# Patient Record
Sex: MALE | Race: WHITE | NOT HISPANIC OR LATINO | URBAN - METROPOLITAN AREA
[De-identification: names, ages, dates, MRNs, and addresses within clinical notes are randomized per-mention and may not be internally consistent; named-entity substitution may affect disease eponyms.]

---

## 2018-09-27 ENCOUNTER — OFFICE (AMBULATORY)
Dept: URBAN - METROPOLITAN AREA CLINIC 64 | Facility: CLINIC | Age: 28
End: 2018-09-27

## 2018-09-27 VITALS
HEIGHT: 69 IN | SYSTOLIC BLOOD PRESSURE: 128 MMHG | DIASTOLIC BLOOD PRESSURE: 74 MMHG | WEIGHT: 258 LBS | HEART RATE: 97 BPM

## 2018-09-27 DIAGNOSIS — R11.2 NAUSEA WITH VOMITING, UNSPECIFIED: ICD-10-CM

## 2018-09-27 DIAGNOSIS — K21.9 GASTRO-ESOPHAGEAL REFLUX DISEASE WITHOUT ESOPHAGITIS: ICD-10-CM

## 2018-09-27 DIAGNOSIS — R10.33 PERIUMBILICAL PAIN: ICD-10-CM

## 2018-09-27 PROCEDURE — 99203 OFFICE O/P NEW LOW 30 MIN: CPT | Performed by: NURSE PRACTITIONER

## 2018-09-27 RX ORDER — OMEPRAZOLE 40 MG/1
40 CAPSULE, DELAYED RELEASE ORAL
Qty: 90 | Refills: 3 | Status: COMPLETED
Start: 2018-09-27 | End: 2018-12-18

## 2018-12-18 ENCOUNTER — OFFICE (AMBULATORY)
Dept: URBAN - METROPOLITAN AREA CLINIC 64 | Facility: CLINIC | Age: 28
End: 2018-12-18

## 2018-12-18 VITALS
SYSTOLIC BLOOD PRESSURE: 125 MMHG | DIASTOLIC BLOOD PRESSURE: 83 MMHG | HEIGHT: 69 IN | HEART RATE: 109 BPM | WEIGHT: 255 LBS

## 2018-12-18 DIAGNOSIS — K64.8 OTHER HEMORRHOIDS: ICD-10-CM

## 2018-12-18 DIAGNOSIS — K60.2 ANAL FISSURE, UNSPECIFIED: ICD-10-CM

## 2018-12-18 PROCEDURE — 99214 OFFICE O/P EST MOD 30 MIN: CPT | Performed by: INTERNAL MEDICINE

## 2019-04-08 ENCOUNTER — OFFICE (AMBULATORY)
Dept: URBAN - METROPOLITAN AREA CLINIC 64 | Facility: CLINIC | Age: 29
End: 2019-04-08

## 2019-04-08 VITALS
SYSTOLIC BLOOD PRESSURE: 140 MMHG | HEIGHT: 69 IN | DIASTOLIC BLOOD PRESSURE: 85 MMHG | WEIGHT: 260 LBS | HEART RATE: 93 BPM

## 2019-04-08 DIAGNOSIS — K60.2 ANAL FISSURE, UNSPECIFIED: ICD-10-CM

## 2019-04-08 DIAGNOSIS — K64.8 OTHER HEMORRHOIDS: ICD-10-CM

## 2019-04-08 PROCEDURE — 99214 OFFICE O/P EST MOD 30 MIN: CPT | Performed by: INTERNAL MEDICINE

## 2019-04-09 ENCOUNTER — ON CAMPUS - OUTPATIENT (AMBULATORY)
Dept: URBAN - METROPOLITAN AREA HOSPITAL 2 | Facility: HOSPITAL | Age: 29
End: 2019-04-09

## 2019-04-09 ENCOUNTER — OFFICE (AMBULATORY)
Dept: URBAN - METROPOLITAN AREA PATHOLOGY 4 | Facility: PATHOLOGY | Age: 29
End: 2019-04-09
Payer: COMMERCIAL

## 2019-04-09 VITALS
RESPIRATION RATE: 16 BRPM | HEART RATE: 102 BPM | SYSTOLIC BLOOD PRESSURE: 130 MMHG | SYSTOLIC BLOOD PRESSURE: 154 MMHG | RESPIRATION RATE: 20 BRPM | HEART RATE: 93 BPM | SYSTOLIC BLOOD PRESSURE: 134 MMHG | SYSTOLIC BLOOD PRESSURE: 168 MMHG | SYSTOLIC BLOOD PRESSURE: 127 MMHG | DIASTOLIC BLOOD PRESSURE: 76 MMHG | DIASTOLIC BLOOD PRESSURE: 84 MMHG | HEART RATE: 80 BPM | DIASTOLIC BLOOD PRESSURE: 82 MMHG | DIASTOLIC BLOOD PRESSURE: 71 MMHG | DIASTOLIC BLOOD PRESSURE: 86 MMHG | OXYGEN SATURATION: 98 % | HEART RATE: 89 BPM | HEART RATE: 95 BPM | SYSTOLIC BLOOD PRESSURE: 118 MMHG | OXYGEN SATURATION: 99 % | OXYGEN SATURATION: 100 % | SYSTOLIC BLOOD PRESSURE: 123 MMHG | HEART RATE: 98 BPM | HEIGHT: 69 IN | TEMPERATURE: 97.3 F | DIASTOLIC BLOOD PRESSURE: 80 MMHG | OXYGEN SATURATION: 94 % | DIASTOLIC BLOOD PRESSURE: 67 MMHG | RESPIRATION RATE: 18 BRPM | WEIGHT: 251 LBS | HEART RATE: 99 BPM

## 2019-04-09 DIAGNOSIS — K29.50 UNSPECIFIED CHRONIC GASTRITIS WITHOUT BLEEDING: ICD-10-CM

## 2019-04-09 DIAGNOSIS — K52.9 NONINFECTIVE GASTROENTERITIS AND COLITIS, UNSPECIFIED: ICD-10-CM

## 2019-04-09 DIAGNOSIS — R19.5 OTHER FECAL ABNORMALITIES: ICD-10-CM

## 2019-04-09 DIAGNOSIS — K60.2 ANAL FISSURE, UNSPECIFIED: ICD-10-CM

## 2019-04-09 DIAGNOSIS — K64.8 OTHER HEMORRHOIDS: ICD-10-CM

## 2019-04-09 PROBLEM — K60.0 ACUTE ANAL FISSURE: Status: ACTIVE | Noted: 2019-04-09

## 2019-04-09 LAB
GI HISTOLOGY: A. SELECT: (no result)
GI HISTOLOGY: PDF REPORT: (no result)

## 2019-04-09 PROCEDURE — 88305 TISSUE EXAM BY PATHOLOGIST: CPT | Performed by: INTERNAL MEDICINE

## 2019-04-09 PROCEDURE — 45331 SIGMOIDOSCOPY AND BIOPSY: CPT | Performed by: INTERNAL MEDICINE

## 2019-05-06 ENCOUNTER — OFFICE (AMBULATORY)
Dept: URBAN - METROPOLITAN AREA CLINIC 64 | Facility: CLINIC | Age: 29
End: 2019-05-06

## 2019-05-06 VITALS
WEIGHT: 259 LBS | DIASTOLIC BLOOD PRESSURE: 55 MMHG | HEIGHT: 69 IN | SYSTOLIC BLOOD PRESSURE: 136 MMHG | HEART RATE: 72 BPM

## 2019-05-06 DIAGNOSIS — K60.2 ANAL FISSURE, UNSPECIFIED: ICD-10-CM

## 2019-05-06 DIAGNOSIS — K59.00 CONSTIPATION, UNSPECIFIED: ICD-10-CM

## 2019-05-06 DIAGNOSIS — R19.5 OTHER FECAL ABNORMALITIES: ICD-10-CM

## 2019-05-06 PROCEDURE — 99212 OFFICE O/P EST SF 10 MIN: CPT | Performed by: INTERNAL MEDICINE

## 2020-03-26 ENCOUNTER — TELEHEALTH PROVIDED OTHER THAN IN PATIENT'S HOME (AMBULATORY)
Dept: URBAN - METROPOLITAN AREA TELEHEALTH 4 | Facility: TELEHEALTH | Age: 30
End: 2020-03-26

## 2020-03-26 VITALS — HEIGHT: 69 IN

## 2020-03-26 DIAGNOSIS — K60.2 ANAL FISSURE, UNSPECIFIED: ICD-10-CM

## 2020-03-26 DIAGNOSIS — K64.8 OTHER HEMORRHOIDS: ICD-10-CM

## 2020-03-26 DIAGNOSIS — R11.2 NAUSEA WITH VOMITING, UNSPECIFIED: ICD-10-CM

## 2020-03-26 PROCEDURE — 99213 OFFICE O/P EST LOW 20 MIN: CPT | Performed by: INTERNAL MEDICINE

## 2021-04-22 ENCOUNTER — OFFICE (AMBULATORY)
Dept: URBAN - METROPOLITAN AREA CLINIC 64 | Facility: CLINIC | Age: 31
End: 2021-04-22

## 2021-04-22 VITALS
HEART RATE: 110 BPM | HEIGHT: 69 IN | DIASTOLIC BLOOD PRESSURE: 83 MMHG | SYSTOLIC BLOOD PRESSURE: 129 MMHG | WEIGHT: 246 LBS

## 2021-04-22 DIAGNOSIS — K64.5 PERIANAL VENOUS THROMBOSIS: ICD-10-CM

## 2021-04-22 PROCEDURE — 99214 OFFICE O/P EST MOD 30 MIN: CPT | Performed by: NURSE PRACTITIONER

## 2022-05-19 ENCOUNTER — HOSPITAL ENCOUNTER (EMERGENCY)
Facility: HOSPITAL | Age: 32
Discharge: HOME | End: 2022-05-19
Attending: EMERGENCY MEDICINE | Admitting: EMERGENCY MEDICINE

## 2022-05-19 VITALS
HEART RATE: 87 BPM | OXYGEN SATURATION: 99 % | TEMPERATURE: 97.2 F | SYSTOLIC BLOOD PRESSURE: 141 MMHG | DIASTOLIC BLOOD PRESSURE: 82 MMHG | HEIGHT: 69 IN | BODY MASS INDEX: 36.21 KG/M2 | RESPIRATION RATE: 16 BRPM | WEIGHT: 244.49 LBS

## 2022-05-19 DIAGNOSIS — R22.0 LEFT FACIAL SWELLING: ICD-10-CM

## 2022-05-19 DIAGNOSIS — H66.92 LEFT OTITIS MEDIA, UNSPECIFIED OTITIS MEDIA TYPE: Primary | ICD-10-CM

## 2022-05-19 PROCEDURE — 99282 EMERGENCY DEPT VISIT SF MDM: CPT

## 2022-05-19 RX ORDER — METHYLPREDNISOLONE 4 MG/1
TABLET ORAL
Qty: 21 TABLET | Refills: 0 | Status: SHIPPED | OUTPATIENT
Start: 2022-05-19

## 2022-05-19 RX ORDER — AMOXICILLIN AND CLAVULANATE POTASSIUM 875; 125 MG/1; MG/1
1 TABLET, FILM COATED ORAL 2 TIMES DAILY
Qty: 20 TABLET | Refills: 0 | Status: SHIPPED | OUTPATIENT
Start: 2022-05-19

## 2022-05-19 NOTE — ED NOTES
Pt reports having had cold symptoms with fever over the previous weekend and feeling fine on Monday. Now 3 days later significant swelling to left jaw and face with tenderness is present. No difficulty with swallowing or breathing.

## 2022-05-19 NOTE — ED PROVIDER NOTES
"Subjective   Chief complaint: Left facial swelling    31-year-old male presents with left facial swelling.  Patient states he was sick last weekend with fever, body aches, and left ear pain.  He states he felt better on Monday.  For the last 2 days he has had a progressively worsening swelling to the left side of the face near his left ear.  He reports moderate pain with this.  He has not been running a fever anymore.  He denies sore throat or any tooth pain.  He states he still has a little pain in his left ear but it is better than it was over the weekend.  No known sick contacts.  He denies any alleviating or exacerbating factors.      History provided by:  Patient      Review of Systems   Constitutional: Negative for fever.   HENT: Positive for ear pain. Negative for congestion, dental problem and sore throat.    Respiratory: Negative for cough and shortness of breath.    Cardiovascular: Negative for chest pain.   Gastrointestinal: Negative for abdominal pain, diarrhea and vomiting.   Skin: Negative for rash.   Neurological: Negative for headaches.       No past medical history on file.    No Known Allergies    No past surgical history on file.    No family history on file.    Social History     Socioeconomic History   • Marital status:        /82 (BP Location: Left arm, Patient Position: Sitting)   Pulse 87   Temp 97.1 °F (36.2 °C)   Resp 18   Ht 175.3 cm (69\")   Wt 111 kg (244 lb 7.8 oz)   SpO2 99%   BMI 36.10 kg/m²       Objective   Physical Exam  Vitals and nursing note reviewed.   Constitutional:       Appearance: Normal appearance.   HENT:      Head: Normocephalic and atraumatic.      Comments: There is significant swelling to the right side of the face in the preauricular region.  There is no erythema or warmth.  Mildly tender to palpation.  No drainable abscess.     Right Ear: Tympanic membrane normal.      Left Ear: Tympanic membrane is erythematous and bulging.      Mouth/Throat:    "   Mouth: Mucous membranes are moist.      Pharynx: Oropharynx is clear.   Eyes:      Pupils: Pupils are equal, round, and reactive to light.   Cardiovascular:      Rate and Rhythm: Normal rate and regular rhythm.      Heart sounds: Normal heart sounds.   Pulmonary:      Effort: Pulmonary effort is normal. No respiratory distress.      Breath sounds: Normal breath sounds.   Skin:     General: Skin is warm and dry.   Neurological:      Mental Status: He is alert and oriented to person, place, and time.         Procedures           ED Course                                                 MDM   Patient does have evidence of left otitis media.  The swelling to the left side of the face may be reactive lymph nodes.  He will be given a prescription for Augmentin.  He will also be given a prescription for Medrol Dosepak.  He will follow-up with his primary doctor.      Final diagnoses:   Left otitis media, unspecified otitis media type   Left facial swelling       ED Disposition  ED Disposition     ED Disposition   Discharge    Condition   Stable    Comment   --             Radha Bush, APRN  [address] [address]  [phone]    Call in 2 days           Medication List      New Prescriptions    amoxicillin-clavulanate 875-125 MG per tablet  Commonly known as: AUGMENTIN  Take 1 tablet by mouth 2 (Two) Times a Day.     methylPREDNISolone 4 MG dose pack  Commonly known as: MEDROL  Take as directed on package instructions.           Where to Get Your Medications      You can get these medications from any pharmacy    Bring a paper prescription for each of these medications  · amoxicillin-clavulanate 875-125 MG per tablet  · methylPREDNISolone 4 MG dose pack          Robert Gunter MD  05/19/22 [id number]

## 2023-08-31 ENCOUNTER — OFFICE (AMBULATORY)
Dept: URBAN - METROPOLITAN AREA CLINIC 64 | Facility: CLINIC | Age: 33
End: 2023-08-31

## 2023-08-31 VITALS
WEIGHT: 246 LBS | DIASTOLIC BLOOD PRESSURE: 100 MMHG | HEART RATE: 83 BPM | HEIGHT: 69 IN | SYSTOLIC BLOOD PRESSURE: 151 MMHG

## 2023-08-31 DIAGNOSIS — K64.5 PERIANAL VENOUS THROMBOSIS: ICD-10-CM

## 2023-08-31 DIAGNOSIS — K62.89 OTHER SPECIFIED DISEASES OF ANUS AND RECTUM: ICD-10-CM

## 2023-08-31 DIAGNOSIS — K62.5 HEMORRHAGE OF ANUS AND RECTUM: ICD-10-CM

## 2023-08-31 PROCEDURE — 99213 OFFICE O/P EST LOW 20 MIN: CPT

## 2023-09-07 ENCOUNTER — OFFICE VISIT (OUTPATIENT)
Dept: SURGERY | Facility: CLINIC | Age: 33
End: 2023-09-07
Payer: COMMERCIAL

## 2023-09-07 VITALS
OXYGEN SATURATION: 97 % | HEIGHT: 69 IN | HEART RATE: 97 BPM | DIASTOLIC BLOOD PRESSURE: 101 MMHG | BODY MASS INDEX: 37.33 KG/M2 | RESPIRATION RATE: 18 BRPM | TEMPERATURE: 97 F | WEIGHT: 252 LBS | SYSTOLIC BLOOD PRESSURE: 156 MMHG

## 2023-09-07 DIAGNOSIS — K64.5 THROMBOSED EXTERNAL HEMORRHOID: Primary | ICD-10-CM

## 2023-09-07 DIAGNOSIS — K60.2 FISSURE IN ANO: ICD-10-CM

## 2023-09-07 PROCEDURE — 99203 OFFICE O/P NEW LOW 30 MIN: CPT | Performed by: SURGERY

## 2023-09-07 RX ORDER — DESVENLAFAXINE 100 MG/1
1 TABLET, EXTENDED RELEASE ORAL DAILY
COMMUNITY
Start: 2023-08-27

## 2023-09-07 RX ORDER — OMEPRAZOLE 40 MG/1
1 CAPSULE, DELAYED RELEASE ORAL DAILY
COMMUNITY
Start: 2023-08-27

## 2023-09-07 RX ORDER — LISDEXAMFETAMINE DIMESYLATE 40 MG/1
40 CAPSULE ORAL EVERY MORNING
COMMUNITY
Start: 2023-08-10

## 2023-09-07 RX ORDER — LISINOPRIL 40 MG/1
1 TABLET ORAL DAILY
COMMUNITY
Start: 2023-08-28

## 2023-09-07 NOTE — PROGRESS NOTES
"Subjective   Michael Gonzalez is a 33 y.o. male.   Chief Complaint   Patient presents with    Hemorrhoids     New pt ref Jazmín Asif NP, Hemorrhoids      BP (!) 156/101 (BP Location: Right arm, Patient Position: Sitting, Cuff Size: Large Adult)   Pulse 97   Temp 97 °F (36.1 °C) (Infrared)   Resp 18   Ht 175.3 cm (69\")   Wt 114 kg (252 lb)   SpO2 97%   BMI 37.21 kg/m²     HISTORY OF PRESENT ILLNESS:  33-year-old gentleman referred over to me for evaluation of thrombosed external hemorrhoid.  He says that he has had a long history of having some perianal issues.  He was first diagnosed with anal fissure back in 2018 or 2019.  He has had a colonoscopy and flexible sigmoidoscopy evaluating this region.  He says that he was placed on nitro and did resolve.  He has had ongoing issues with hemorrhoids mostly feeling PET pressure and pain with bowel movements rarely having significant bleeding issues.  He most recently had a flareup of his severe pain that was also associated with a painful external hemorrhoid.  Once again seen by gastroenterology and then was referred to me for the thrombosed external hemorrhoid.  He says that he has 2 bad cream at home and he is going to get it filled and start using it to alleviate his discomfort.      He says that he does not really sit straight anymore he only squeezes for about 2 minutes.  He goes about 2-3 times a day.      Outpatient Encounter Medications as of 9/7/2023   Medication Sig Dispense Refill    desvenlafaxine (PRISTIQ) 100 MG 24 hr tablet Take 1 tablet by mouth Daily.      lisinopril (PRINIVIL,ZESTRIL) 40 MG tablet Take 1 tablet by mouth Daily.      omeprazole (priLOSEC) 40 MG capsule Take 1 capsule by mouth Daily.      Vyvanse 40 MG capsule Take 1 capsule by mouth Every Morning       No facility-administered encounter medications on file as of 9/7/2023.     History reviewed. No pertinent past medical history.  Past Surgical History:   Procedure Laterality Date "    CARPAL TUNNEL RELEASE Bilateral     WISDOM TOOTH EXTRACTION       Family History   Problem Relation Age of Onset    Hypertension Mother     Heart attack Father        Social History     Tobacco Use    Smoking status: Never     Passive exposure: Never    Smokeless tobacco: Never   Vaping Use    Vaping Use: Never used   Substance Use Topics    Alcohol use: Not Currently    Drug use: Never     Patient has no known allergies.    Review of Systems  Positive for anal pain positive for anal bleeding  Objective     Physical Exam  Constitutional:       Appearance: Normal appearance.   HENT:      Head: Normocephalic and atraumatic.   Cardiovascular:      Rate and Rhythm: Normal rate.   Pulmonary:      Effort: Pulmonary effort is normal. No respiratory distress.   Genitourinary:     Comments: On rectal examination in the right posterior lateral location there is evidence of a previously thrombosed hemorrhoid it is softening up there is some mucosal irregularity there is no necrosis there is mild tenderness to palpation.  In the posterior anal canal there appears to be evidence of a fissure  Skin:     General: Skin is warm and dry.   Neurological:      Mental Status: He is alert.         Assessment & Plan   Diagnoses and all orders for this visit:    1. Thrombosed external hemorrhoid (Primary)    2. Fissure in ano    With regards to the thrombosed external hemorrhoid he is now a few weeks into this and this is gradually improving and resolving.  He says that he thinks the fissure flared up about the same time 2.  He is going to use the topical treatment to help with the symptoms.    With the hemorrhoid and the fissure basically adjacent I have some concern that a standard hemorrhoidectomy for prevention of recurrent issues with this 1 hemorrhoid column I would have a hard time healing.  Similarly if there is an issue with sphincter tone that could be contributing to the hemorrhoids.  Get a go ahead and refer him over to the  colorectal surgeons to evaluate him and hopefully at that time the thrombosed hemorrhoid will have resolved the fissure hopefully will be healing as well and he can abducting to talk to him about how to move forward with prevention of ongoing flareups of his issues.    Raul Jefferson MD  9/7/2023  2:38 PM EDT    This note was created using Dragon Voice Recognition software.

## 2023-10-16 PROBLEM — K60.2 ANAL FISSURE: Status: ACTIVE | Noted: 2019-04-09

## 2023-10-16 PROBLEM — K59.00 CONSTIPATION: Status: ACTIVE | Noted: 2023-10-16

## 2023-10-16 PROBLEM — K62.5 RECTAL BLEEDING: Status: ACTIVE | Noted: 2023-10-16

## 2023-10-16 PROBLEM — E78.00 PURE HYPERCHOLESTEROLEMIA: Status: ACTIVE | Noted: 2023-10-16

## 2023-10-16 PROBLEM — R11.2 NAUSEA AND VOMITING: Status: ACTIVE | Noted: 2023-10-16

## 2023-10-16 PROBLEM — R19.5 OTHER FECAL ABNORMALITIES: Status: ACTIVE | Noted: 2019-04-09

## 2023-10-16 PROBLEM — K64.9 HEMORRHOIDS: Status: ACTIVE | Noted: 2023-10-16

## 2023-10-16 PROBLEM — K62.89 RECTAL PAIN: Status: ACTIVE | Noted: 2023-10-16

## 2023-10-16 PROBLEM — R10.33 PERIUMBILICAL ABDOMINAL PAIN: Status: ACTIVE | Noted: 2023-10-16

## 2023-10-16 PROBLEM — K21.9 GASTRO-ESOPHAGEAL REFLUX DISEASE WITHOUT ESOPHAGITIS: Status: ACTIVE | Noted: 2023-10-16

## 2023-10-16 PROBLEM — K64.5 PERIANAL VENOUS THROMBOSIS: Status: ACTIVE | Noted: 2023-10-16

## 2023-10-17 ENCOUNTER — OFFICE VISIT (OUTPATIENT)
Dept: SURGERY | Facility: CLINIC | Age: 33
End: 2023-10-17
Payer: COMMERCIAL

## 2023-10-17 VITALS
SYSTOLIC BLOOD PRESSURE: 118 MMHG | WEIGHT: 245.5 LBS | HEIGHT: 69 IN | HEART RATE: 98 BPM | BODY MASS INDEX: 36.36 KG/M2 | OXYGEN SATURATION: 98 % | DIASTOLIC BLOOD PRESSURE: 78 MMHG

## 2023-10-17 DIAGNOSIS — K64.4 EXTERNAL HEMORRHOID: ICD-10-CM

## 2023-10-17 DIAGNOSIS — K60.2 ANAL FISSURE: Primary | ICD-10-CM

## 2023-10-17 RX ORDER — HYDROCORTISONE 25 MG/G
CREAM TOPICAL
Qty: 30 G | Refills: 1 | Status: SHIPPED | OUTPATIENT
Start: 2023-10-17

## 2023-10-17 RX ORDER — HYDROCHLOROTHIAZIDE 12.5 MG/1
12.5 TABLET ORAL DAILY
COMMUNITY
Start: 2023-09-27

## 2023-10-17 RX ORDER — LIDOCAINE 5% 5 G/100G
1 CREAM TOPICAL 3 TIMES DAILY
COMMUNITY
Start: 2023-09-07

## 2023-10-17 RX ORDER — LORAZEPAM 0.5 MG/1
0.5 TABLET ORAL DAILY
COMMUNITY
Start: 2023-10-03

## 2023-10-17 NOTE — PROGRESS NOTES
Michael Gonzalez is a 33 y.o. male who is seen as a consult at the request of Raul Jefferson MD for Rectal Bleeding.      HPI:  Pt presents today for evaluation of intermittent anal pain and swelling x5 years.   States he had a colon[place] [facility] symptoms first started.   Denies any personal Hx of colon polyps, but states that we was found to have an anal fissure.   Fissure was treated with Nitroglycerin/Lidocaine compound cream.   Tolerates this medication well without HA's.    Was seen by GI on 08/31/2023 and found to have a large thrombosed hemorrhoid.   He was later seen by Dr. Jefferson with general surgery on 09/07/2023 with evidence of a previously thrombosed hemorrhoid in the right posterior position as well as a posterior anal fissure.  Pt states he applies 2-BAD cream PRN for hemorrhoid irritation.     Pt has a BM every 2 days.   Taking 1 dose of Miralax and 1 dose of fiber daily.   [place]k:4-5.  If forgets to take Miralax or fiber, or becomes dehydrated, will pass a harder stool with increased pain.   Does not sit on commode longer than 2 minutes and tries to avoid straining.  Uses a squatty potty.     Denies any current symptoms other than some swelling on the right side.  Not painful.     Not taking any anticoagulation.  No previous anorectal surgeries.     No known FHx of colon polyps, colon cancer, or IBD.     Past Medical History:   Diagnosis Date    Anal fissure 04/09/2019    Anxiety 08/13/2015    Attention deficit disorder (ADD) without hyperactivity 08/13/2015    Constipation 10/16/2023    Fissure in ano 08/2023    GERD (gastroesophageal reflux disease)     Hemorrhoids 10/16/2023    Hyperlipidemia 08/13/2015    Hypertension 08/13/2015    Kidney stone 08/13/2015    Left facial swelling 05/19/2022    SEEN AT [facility]    Perianal venous thrombosis 10/16/2023    Rectal bleeding 10/16/2023    Right foot sprain 05/12/2023    SEEN AT        Past  Surgical History:   Procedure Laterality Date    CARPAL TUNNEL RELEASE Bilateral     COLONOSCOPY N/A 2018    FLEXIBLE SIGMOIDOSCOPY N/A 04/09/2019    ANAL FISSURE, ERYTHEMA IN SIGMOID AND RECTUM    WISDOM TOOTH EXTRACTION Bilateral        Social History:   reports that he quit smoking about 6 years ago. His smoking use included cigarettes. He has a 4.00 pack-year smoking history. He has been exposed to tobacco smoke. He has never used smokeless tobacco. He reports that he does not currently use alcohol. He reports that he does not use drugs.      Marriage status:     Family History   Problem Relation Age of Onset    Hypertension Mother     Heart attack Father          Current Outpatient Medications:     desvenlafaxine (PRISTIQ) 100 MG 24 hr tablet, Take 1 tablet by mouth Daily., Disp: , Rfl:     hydroCHLOROthiazide (HYDRODIURIL) 12.5 MG tablet, Take 1 tablet by mouth Daily., Disp: , Rfl:     Lidocaine 5 % cream, Apply 1 Application topically to the appropriate area as directed 3 (Three) Times a Day. BACLOFEN-AMITRIPTYLINE-DILTIAZEM-LIDO(2%-2%-2%-2%) CREAM APPLY SMALL AMOUNT EXTERNALLY, WAIT FOR 5 MINUTES, THEN INSERT 0.5ML RECTALLY TWICE DAILY AS DIRECTED, Disp: , Rfl:     lisinopril (PRINIVIL,ZESTRIL) 40 MG tablet, Take 1 tablet by mouth Daily., Disp: , Rfl:     LORazepam (ATIVAN) 0.5 MG tablet, Take 1 tablet by mouth Daily., Disp: , Rfl:     omeprazole (priLOSEC) 40 MG capsule, Take 1 capsule by mouth Daily., Disp: , Rfl:     Vyvanse 40 MG capsule, Take 1 capsule by mouth Every Morning, Disp: , Rfl:     Hydrocortisone, Perianal, (Anusol-HC) 2.5 % rectal cream, Apply rectally 3 times daily.  Include applicator., Disp: 30 g, Rfl: 1    Allergy  Patient has no known allergies.    Review of Systems   Constitutional: Negative for decreased appetite and weight gain.   HENT:  Negative for congestion, hearing loss and hoarse voice.    Eyes:  Negative for blurred vision, discharge and visual disturbance.    Cardiovascular:  Negative for chest pain, cyanosis and leg swelling.   Respiratory:  Negative for cough, shortness of breath, sleep disturbances due to breathing and snoring.    Endocrine: Negative for cold intolerance and heat intolerance.   Hematologic/Lymphatic: Does not bruise/bleed easily.   Skin:  Negative for itching, poor wound healing and skin cancer.   Musculoskeletal:  Negative for arthritis, back pain, joint pain and joint swelling.   Gastrointestinal:  Positive for constipation and hematochezia. Negative for abdominal pain, change in bowel habit and bowel incontinence.   Genitourinary:  Negative for bladder incontinence, dysuria and hematuria.   Neurological:  Negative for brief paralysis, excessive daytime sleepiness, dizziness, focal weakness, headaches, light-headedness and weakness.   Psychiatric/Behavioral:  Negative for altered mental status and hallucinations. The patient does not have insomnia.    Allergic/Immunologic: Negative for HIV exposure and persistent infections.   All other systems reviewed and are negative.      Vitals:    10/17/23 0958   BP: 118/78   Pulse: 98   SpO2: 98%     Body mass index is 36.25 kg/m².    Physical Exam  Exam conducted with a chaperone present.   Constitutional:       General: He is not in acute distress.     Appearance: He is well-developed.   HENT:      Head: Normocephalic and atraumatic.      Nose: Nose normal.   Eyes:      Conjunctiva/sclera: Conjunctivae normal.      Pupils: Pupils are equal, round, and reactive to light.   Neck:      Trachea: No tracheal deviation.   Pulmonary:      Effort: Pulmonary effort is normal. No respiratory distress.      Breath sounds: Normal breath sounds.   Abdominal:      General: Bowel sounds are normal. There is no distension.      Palpations: Abdomen is soft.   Genitourinary:     Comments: Perianal exam: Mild enlargement of the right posterior external hemorrhoid. Soft, non-thrombosed. No obvious fissure visualized.   ROMEL-  Increased tone. Area of rigidity anteriorly. No masses.   Anoscopy performed: Attempted, but aborted due to Pt discomfort.   Musculoskeletal:         General: No deformity. Normal range of motion.      Cervical back: Normal range of motion.   Skin:     General: Skin is warm and dry.   Neurological:      Mental Status: He is alert and oriented to person, place, and time.      Cranial Nerves: No cranial nerve deficit.      Coordination: Coordination normal.      Gait: Gait normal.   Psychiatric:         Behavior: Behavior normal.         Judgment: Judgment normal.       Review of Medical Record:  I reviewed medical records as detailed in HPI.     Assessment:  1. Anal fissure    2. External hemorrhoid    - New    Plan:  - Discussed physical exam findings with Pt as well as common causes of hemorrhoids and anal fissures.   - Encouraged good bowel habits in order to reduce risk of recurrent symptoms.  - Increase fiber. Recommended 30-40 grams daily. Printed instructions provided.   - Continue Miralax every other day. Adjust as needed.   - Encouraged maintaining adequate hydration    Anal Fissure:  - Refill for Nitroglycerin/Lidocaine compound cream provided. Apply externally TID as needed.   - Discussed additional treatment options including chemical sphincterotomy with Botox vs LIAS. Both procedures, success rates, risks, and benefits discussed at length. After consideration, Pt would like to continue conservative treatment with the compound cream and fiber therapy.     Hemorrhoids:  - Rx for Anusol-HC 2.5% cream provided. Pt instructed to applying internally and externally TID x7 days for hemorrhoidal swelling.   - Discussed possible hemorrhoidectomy and discussed with Pt that this would not guarantee that he would never have another hemorrhoid flare-up in this area as not all of these veins could be removed. He would still need to practice good bowel habits to avoid hemorrhoid flare-ups. Pt expresses understanding and  does not wish to undergo this procedure at this time.     Follow up as needed.

## 2023-10-20 ENCOUNTER — PATIENT ROUNDING (BHMG ONLY) (OUTPATIENT)
Dept: SURGERY | Facility: CLINIC | Age: 33
End: 2023-10-20
Payer: COMMERCIAL

## 2023-10-20 NOTE — PROGRESS NOTES
October 20, 2023    Hello, may I speak with Michael Gonzalez?    My name is Moo       I am  with [facility]  [address]  [facility] [address] [phone].    Before we get started may I verify your date of birth? 1990    I am calling to officially welcome you to our practice and ask about your recent visit. Is this a good time to talk? yes    Tell me about your visit with us. What things went well?  Everything was fine.        We're always looking for ways to make our patients' experiences even better. Do you have recommendations on ways we may improve?  no    Overall were you satisfied with your first visit to our practice? yes       I appreciate you taking the time to speak with me today. Is there anything else I can do for you? no      Thank you, and have a great day.